# Patient Record
Sex: FEMALE | URBAN - NONMETROPOLITAN AREA
[De-identification: names, ages, dates, MRNs, and addresses within clinical notes are randomized per-mention and may not be internally consistent; named-entity substitution may affect disease eponyms.]

---

## 2019-06-18 ENCOUNTER — DOCTOR'S OFFICE (OUTPATIENT)
Dept: URBAN - NONMETROPOLITAN AREA CLINIC 1 | Facility: CLINIC | Age: 44
Setting detail: OPHTHALMOLOGY
End: 2019-06-18
Payer: COMMERCIAL

## 2019-06-18 ENCOUNTER — RX ONLY (RX ONLY)
Age: 44
End: 2019-06-18

## 2019-06-18 DIAGNOSIS — H52.13: ICD-10-CM

## 2019-06-18 DIAGNOSIS — H52.223: ICD-10-CM

## 2019-06-18 PROCEDURE — 92004 COMPRE OPH EXAM NEW PT 1/>: CPT | Performed by: OPTOMETRIST

## 2019-06-18 PROCEDURE — 92310 CONTACT LENS FITTING OU: CPT | Performed by: OPTOMETRIST

## 2019-06-18 ASSESSMENT — REFRACTION_MANIFEST
OD_SPHERE: -3.25
OD_AXIS: 010
OU_VA: 20/
OS_VA2: 20/20-2
OS_VA2: 20/
OS_AXIS: 170
OS_CYLINDER: -2.50
OD_VA1: 20/
OS_SPHERE: -3.25
OD_VA1: 20/20-2
OS_VA1: 20/
OS_VA1: 20/20-2
OD_CYLINDER: -2.50
OS_VA3: 20/
OD_VA2: 20/20-2
OD_VA3: 20/
OD_VA3: 20/
OS_VA3: 20/
OD_VA2: 20/
OU_VA: 20/

## 2019-06-18 ASSESSMENT — REFRACTION_CURRENTRX
OD_OVR_VA: 20/
OD_AXIS: 004
OS_SPHERE: -3.50
OD_SPHERE: -3.00
OS_CYLINDER: -2.50
OD_CYLINDER: -2.00
OD_VPRISM_DIRECTION: SV
OS_AXIS: 166
OS_OVR_VA: 20/
OS_VPRISM_DIRECTION: SV
OS_OVR_VA: 20/
OD_OVR_VA: 20/
OS_OVR_VA: 20/
OD_OVR_VA: 20/

## 2019-06-18 ASSESSMENT — REFRACTION_AUTOREFRACTION
OS_AXIS: 171
OS_SPHERE: -3.50
OD_SPHERE: -4.50
OS_CYLINDER: -3.00
OD_AXIS: 013
OD_CYLINDER: -2.75

## 2019-06-18 ASSESSMENT — SUPERFICIAL PUNCTATE KERATITIS (SPK)
OS_SPK: 1+
OD_SPK: 1+

## 2019-06-18 ASSESSMENT — CONFRONTATIONAL VISUAL FIELD TEST (CVF)
OD_FINDINGS: FULL
OS_FINDINGS: FULL

## 2019-06-18 ASSESSMENT — SPHEQUIV_DERIVED
OS_SPHEQUIV: -4.5
OD_SPHEQUIV: -5.875
OD_SPHEQUIV: -4.5
OS_SPHEQUIV: -5

## 2019-06-18 ASSESSMENT — DRY EYES - PHYSICIAN NOTES
OS_GENERALCOMMENTS: +VE SCATTERED INFERIOR SPK
OD_GENERALCOMMENTS: +VE SCATTERED INFERIOR SPK

## 2019-06-18 ASSESSMENT — VISUAL ACUITY
OD_BCVA: 20/20-1
OS_BCVA: 20/30-2

## 2019-07-02 ENCOUNTER — OPTICAL OFFICE (OUTPATIENT)
Dept: URBAN - NONMETROPOLITAN AREA CLINIC 4 | Facility: CLINIC | Age: 44
Setting detail: OPHTHALMOLOGY
End: 2019-07-02

## 2019-07-02 DIAGNOSIS — H52.223: ICD-10-CM

## 2019-07-02 PROCEDURE — S0500 DISPOS CONT LENS: HCPCS | Performed by: OPTOMETRIST

## 2020-06-11 ENCOUNTER — OPTICAL OFFICE (OUTPATIENT)
Dept: URBAN - NONMETROPOLITAN AREA CLINIC 4 | Facility: CLINIC | Age: 45
Setting detail: OPHTHALMOLOGY
End: 2020-06-11

## 2020-06-11 DIAGNOSIS — H52.223: ICD-10-CM

## 2020-06-11 PROCEDURE — S0500 DISPOS CONT LENS: HCPCS | Performed by: OPTOMETRIST

## 2020-08-24 ENCOUNTER — DOCTOR'S OFFICE (OUTPATIENT)
Dept: URBAN - NONMETROPOLITAN AREA CLINIC 1 | Facility: CLINIC | Age: 45
Setting detail: OPHTHALMOLOGY
End: 2020-08-24
Payer: COMMERCIAL

## 2020-08-24 DIAGNOSIS — H52.223: ICD-10-CM

## 2020-08-24 DIAGNOSIS — H52.13: ICD-10-CM

## 2020-08-24 PROCEDURE — 92310 CONTACT LENS FITTING OU: CPT | Performed by: OPTOMETRIST

## 2020-08-24 PROCEDURE — 92014 COMPRE OPH EXAM EST PT 1/>: CPT | Performed by: OPTOMETRIST

## 2020-08-24 ASSESSMENT — REFRACTION_CURRENTRX
OD_SPHERE: -3.25
OD_AXIS: 010
OD_VPRISM_DIRECTION: SV
OD_OVR_VA: 20/
OD_CYLINDER: -2.50
OS_SPHERE: -3.25
OS_OVR_VA: 20/
OS_AXIS: 170
OS_CYLINDER: -2.50
OS_VPRISM_DIRECTION: SV

## 2020-08-24 ASSESSMENT — VISUAL ACUITY
OS_BCVA: 20/30-2
OD_BCVA: 20/30-1

## 2020-08-24 ASSESSMENT — REFRACTION_AUTOREFRACTION
OS_SPHERE: -4.00
OD_AXIS: 016
OD_SPHERE: -3.50
OS_AXIS: 171
OS_CYLINDER: -2.25
OD_CYLINDER: -1.50

## 2020-08-24 ASSESSMENT — SPHEQUIV_DERIVED
OS_SPHEQUIV: -5.125
OD_SPHEQUIV: -4.75
OD_SPHEQUIV: -4.25
OS_SPHEQUIV: -5.25

## 2020-08-24 ASSESSMENT — REFRACTION_MANIFEST
OD_CYLINDER: -2.50
OS_SPHERE: -4.00
OD_VA2: 20/20-2
OS_VA1: 20/20-2
OS_CYLINDER: -2.50
OD_AXIS: 010
OD_SPHERE: -3.50
OD_VA1: 20/20-2
OS_VA2: 20/20-2
OS_AXIS: 170

## 2020-08-24 ASSESSMENT — DRY EYES - PHYSICIAN NOTES
OD_GENERALCOMMENTS: +VE SCATTERED INFERIOR SPK
OS_GENERALCOMMENTS: +VE SCATTERED INFERIOR SPK

## 2020-08-24 ASSESSMENT — SUPERFICIAL PUNCTATE KERATITIS (SPK)
OD_SPK: 1+
OS_SPK: 1+

## 2020-08-24 ASSESSMENT — CONFRONTATIONAL VISUAL FIELD TEST (CVF)
OS_FINDINGS: FULL
OD_FINDINGS: FULL

## 2021-04-08 DIAGNOSIS — Z23 ENCOUNTER FOR IMMUNIZATION: ICD-10-CM

## 2021-08-25 ENCOUNTER — DOCTOR'S OFFICE (OUTPATIENT)
Dept: URBAN - NONMETROPOLITAN AREA CLINIC 1 | Facility: CLINIC | Age: 46
Setting detail: OPHTHALMOLOGY
End: 2021-08-25
Payer: COMMERCIAL

## 2021-08-25 DIAGNOSIS — H52.4: ICD-10-CM

## 2021-08-25 DIAGNOSIS — H52.223: ICD-10-CM

## 2021-08-25 PROBLEM — E05.00 GRAVES/THYROID OPH NO CRISIS ; BOTH EYES: Status: ACTIVE | Noted: 2019-06-18

## 2021-08-25 PROBLEM — H04.123 DRY EYE; BOTH EYES: Status: ACTIVE | Noted: 2019-06-18

## 2021-08-25 PROCEDURE — 92310 CONTACT LENS FITTING OU: CPT | Performed by: OPTOMETRIST

## 2021-08-25 PROCEDURE — 92014 COMPRE OPH EXAM EST PT 1/>: CPT | Performed by: OPTOMETRIST

## 2021-08-25 ASSESSMENT — REFRACTION_CURRENTRX
OD_SPHERE: -3.25
OS_AXIS: 168
OS_SPHERE: -3.25
OD_OVR_VA: 20/
OD_CYLINDER: -2.75
OS_VPRISM_DIRECTION: SV
OD_VPRISM_DIRECTION: SV
OS_OVR_VA: 20/
OD_AXIS: 012
OS_CYLINDER: -2.75

## 2021-08-25 ASSESSMENT — CONFRONTATIONAL VISUAL FIELD TEST (CVF)
OS_FINDINGS: FULL
OD_FINDINGS: FULL

## 2021-08-25 ASSESSMENT — VISUAL ACUITY
OD_BCVA: 20/25-2
OS_BCVA: 20/25-2

## 2021-08-25 ASSESSMENT — REFRACTION_AUTOREFRACTION
OD_AXIS: 019
OS_AXIS: 168
OS_SPHERE: -4.00
OD_SPHERE: -2.25
OS_CYLINDER: -2.50
OD_CYLINDER: -3.25

## 2021-08-25 ASSESSMENT — REFRACTION_MANIFEST
OS_ADD: +1.50
OS_AXIS: 170
OD_AXIS: 010
OD_ADD: +1.50
OS_CYLINDER: -2.50
OD_CYLINDER: -2.50
OD_SPHERE: -3.50
OD_VA1: 20/20-2
OS_VA2: 20/20-2
OS_SPHERE: -4.00
OS_VA1: 20/20-2
OD_VA2: 20/20-2

## 2021-08-25 ASSESSMENT — SUPERFICIAL PUNCTATE KERATITIS (SPK)
OD_SPK: 1+
OS_SPK: 1+

## 2021-08-25 ASSESSMENT — TONOMETRY
OD_IOP_MMHG: 15
OS_IOP_MMHG: 15

## 2021-08-25 ASSESSMENT — SPHEQUIV_DERIVED
OS_SPHEQUIV: -5.25
OS_SPHEQUIV: -5.25
OD_SPHEQUIV: -3.875
OD_SPHEQUIV: -4.75

## 2021-08-25 ASSESSMENT — DRY EYES - PHYSICIAN NOTES
OD_GENERALCOMMENTS: +VE SCATTERED INFERIOR SPK
OS_GENERALCOMMENTS: +VE SCATTERED INFERIOR SPK

## 2025-02-03 ENCOUNTER — OFFICE VISIT (OUTPATIENT)
Dept: URGENT CARE | Facility: CLINIC | Age: 50
End: 2025-02-03
Payer: COMMERCIAL

## 2025-02-03 ENCOUNTER — RESULTS FOLLOW-UP (OUTPATIENT)
Dept: URGENT CARE | Facility: CLINIC | Age: 50
End: 2025-02-03

## 2025-02-03 ENCOUNTER — APPOINTMENT (OUTPATIENT)
Dept: RADIOLOGY | Facility: CLINIC | Age: 50
End: 2025-02-03
Payer: COMMERCIAL

## 2025-02-03 VITALS
DIASTOLIC BLOOD PRESSURE: 64 MMHG | HEIGHT: 64 IN | TEMPERATURE: 98.1 F | BODY MASS INDEX: 32.27 KG/M2 | WEIGHT: 189 LBS | OXYGEN SATURATION: 94 % | HEART RATE: 95 BPM | SYSTOLIC BLOOD PRESSURE: 116 MMHG | RESPIRATION RATE: 14 BRPM
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
## 2025-02-03 DIAGNOSIS — S42.401A ELBOW FRACTURE, RIGHT, CLOSED, INITIAL ENCOUNTER: Primary | ICD-10-CM

## 2025-02-03 DIAGNOSIS — S59.901A ELBOW INJURY, RIGHT, INITIAL ENCOUNTER: ICD-10-CM

## 2025-02-03 PROCEDURE — 99203 OFFICE O/P NEW LOW 30 MIN: CPT

## 2025-02-03 PROCEDURE — 73080 X-RAY EXAM OF ELBOW: CPT

## 2025-02-03 PROCEDURE — 29105 APPLICATION LONG ARM SPLINT: CPT

## 2025-02-03 RX ORDER — ASCORBIC ACID 125 MG
1 TABLET,CHEWABLE ORAL DAILY
COMMUNITY

## 2025-02-03 RX ORDER — LEVOTHYROXINE SODIUM 112 UG/1
112 TABLET ORAL DAILY
COMMUNITY
Start: 2024-11-21 | End: 2025-11-21

## 2025-02-03 RX ORDER — TIRZEPATIDE 7.5 MG/.5ML
INJECTION, SOLUTION SUBCUTANEOUS
COMMUNITY

## 2025-02-03 RX ORDER — ROSUVASTATIN CALCIUM 10 MG/1
10 TABLET, COATED ORAL EVERY EVENING
COMMUNITY

## 2025-02-03 NOTE — PROGRESS NOTES
[facility]        NAME: Dulce Lopez is a 49 y.o. female  : 1975    MRN: [id number]  DATE: February 3, 2025  TIME: 3:49 PM    Assessment and Plan   Elbow fracture, right, closed, initial encounter [S42.401A]  1. Elbow fracture, right, closed, initial encounter  XR elbow 3+ vw right    Ambulatory Referral to Orthopedic Surgery    Splint application      2. Fall on same level due to ice and snow, initial encounter  XR elbow 3+ vw right    Ambulatory Referral to Orthopedic Surgery    Splint application        Provider Radiology Interpretation (preliminary)   Final results will be as per official Radiology Report when available:   RIGHT ELBOW: fracture of the olecranon process and avulsion fracture of the coronoid of the ulna    Placed in orthoglass splint (long arm) and instructed on care. Instructed to follow up with orthopedics tomorrow and to call for appt. If any worsening pain, swelling, numbness/tingling, or you develop coolness to the fingers of your right hand compared to the left hand you are advised to seek care with the ER.     Patient Instructions       Follow up with PCP in 3-5 days.  Proceed to  ER if symptoms worsen.    If tests are performed, our office will contact you with results only if changes need to made to the care plan discussed with you at the visit. [facility]    Chief Complaint     Chief Complaint   Patient presents with    Right Elbow Injury     Fell on ice and landed on Right Elbow. Upson a pop. Swelling, pops/cracks w/movement. Cannot bend arm up and down at elbow. Numbness @ elbow.          History of Present Illness       49-year-old female patient with past medical history of thyroid disease presents to this clinic with complaint of right elbow injury.  Patient reports she fell on the ice and landed on her right elbow.  She reports hearing a popping sensation.  There is no swelling and she reports the elbow pops/cracks  "with movement.  She is unable to bend her arm up and down at the elbow as this causes increased pain.  She reports a numbness feeling at the elbow.    Review of Systems   Review of Systems   Musculoskeletal:  Positive for arthralgias and joint swelling.         Current Medications       Current Outpatient Medications:     levothyroxine 112 mcg tablet, Take 112 mcg by mouth daily, Disp: , Rfl:     Multiple Vitamins-Minerals (Multi Adult Gummies) CHEW, Chew 1 tablet daily, Disp: , Rfl:     rosuvastatin (CRESTOR) 10 MG tablet, Take 10 mg by mouth every evening, Disp: , Rfl:     Zepbound 7.5 MG/0.5ML auto-injector, inject 7.5 mg under the skin every 7 days, Disp: , Rfl:     Current Allergies     Allergies as of 02/03/2025    (No Known Allergies)            The following portions of the patient's history were reviewed and updated as appropriate: allergies, current medications, past family history, past medical history, past social history, past surgical history and problem list.     Past Medical History:   Diagnosis Date    Disease of thyroid gland        Past Surgical History:   Procedure Laterality Date    CHOLECYSTECTOMY      EYE SURGERY      THYROIDECTOMY      TUBAL LIGATION         Family History   Problem Relation Age of Onset    Hyperlipidemia Mother     Cancer Father     Hyperlipidemia Father     Atrial fibrillation Father          Medications have been verified.        Objective   /64   Pulse 95   Temp 98.1 °F (36.7 °C)   Resp 14   Ht 5' 4\" (1.626 m)   Wt 85.7 kg (189 lb)   SpO2 94%   BMI 32.44 kg/m²        Physical Exam     Physical Exam  Vitals and nursing note reviewed.   Constitutional:       Appearance: Normal appearance.   HENT:      Head: Normocephalic and atraumatic.   Cardiovascular:      Rate and Rhythm: Normal rate and regular rhythm.      Pulses: Normal pulses.      Heart sounds: Normal heart sounds.   Pulmonary:      Effort: Pulmonary effort is normal.      Breath sounds: Normal breath " "sounds.   Musculoskeletal:         General: Swelling, tenderness and signs of injury present.      Right elbow: Swelling present. Decreased range of motion. Tenderness present in olecranon process.      Left elbow: Normal.   Skin:     General: Skin is warm and dry.      Capillary Refill: Capillary refill takes less than 2 seconds.   Neurological:      General: No focal deficit present.      Mental Status: She is alert and oriented to person, place, and time.       Orthopedic injury treatment    Date/Time: 2/3/2025 3:45 PM    Performed by: DAISHA Martinez  Authorized by: DAISHA Martinez    Patient Location:  [facility] Protocol:  Procedure performed by:  Consent: Verbal consent obtained. Written consent obtained.  Risks and benefits: risks, benefits and alternatives were discussed  Time out: Immediately prior to procedure a \"time out\" was called to verify the correct patient, procedure, equipment, support staff and site/side marked as required.  Patient understanding: patient states understanding of the procedure being performed  Patient consent: the patient's understanding of the procedure matches consent given  Procedure consent: procedure consent matches procedure scheduled  Radiology Images displayed and confirmed. If images not available, report reviewed: imaging studies available  Patient identity confirmed: verbally with patient    Injury location:  Elbow  Location details:  Right elbow  Injury type:  Fracture  Fracture type: coronoid process    Neurovascular status: Neurovascularly intact    Distal perfusion: normal    Neurological function: normal    Range of motion: reduced    Local anesthesia used?: No    General anesthesia used?: No    Manipulation performed?: No    Immobilization:  Splint  Splint type:  Long arm (posterior)  Supplies used:  Cotton padding, elastic bandage and Ortho-Glass  Neurovascular status: Neurovascularly intact    Distal perfusion: normal    Neurological " function: normal    Range of motion: unchanged    Patient tolerance:  Patient tolerated the procedure well with no immediate complications   Placed in a large sling upon completion of splinting

## 2025-02-03 NOTE — PATIENT INSTRUCTIONS
Keep the splint in place and DO NOT get it wet! You must cover it if bathing and you may not remove it.     Follow up with orthopedics. Call for appt tomorrow (see number on your discharge papers)    Ice and elevate the right elbow every 2-3 hours for 15 minutes each application    If any increased pain, loss of feeling in fingers or they begin to feel extremely cold compared to the fingers on the left hand you are advised to seek care with the emergency room.

## 2025-02-04 ENCOUNTER — OFFICE VISIT (OUTPATIENT)
Dept: OBGYN CLINIC | Facility: CLINIC | Age: 50
End: 2025-02-04
Payer: COMMERCIAL

## 2025-02-04 VITALS
HEART RATE: 85 BPM | TEMPERATURE: 98.3 F | OXYGEN SATURATION: 99 % | RESPIRATION RATE: 16 BRPM | BODY MASS INDEX: 32.27 KG/M2 | WEIGHT: 189 LBS | HEIGHT: 64 IN
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
## 2025-02-04 DIAGNOSIS — S42.401A ELBOW FRACTURE, RIGHT, CLOSED, INITIAL ENCOUNTER: ICD-10-CM

## 2025-02-04 PROCEDURE — 99204 OFFICE O/P NEW MOD 45 MIN: CPT | Performed by: STUDENT IN AN ORGANIZED HEALTH CARE EDUCATION/TRAINING PROGRAM

## 2025-02-04 PROCEDURE — 24670 CLTX ULNAR FX PROX W/O MNPJ: CPT | Performed by: STUDENT IN AN ORGANIZED HEALTH CARE EDUCATION/TRAINING PROGRAM

## 2025-02-04 NOTE — PROGRESS NOTES
Orthopedic Surgery Office Note  Dulce Lopez (49 y.o. female)   : 1975   MRN: [id number]   Encounter Date: 2025 with Dr. Osbaldo Wilson DO  Chief Complaint   Patient presents with    Right Elbow - Pain       Assessment, Plan, & Discussion:     Fracture of Coronoid of Right Elbow  Discussed with the patient that:  - Reviewed physical exam and imaging with patient at time of visit. Radiographic findings demonstrate coronoid fracture of left elbow.   - due to her having no instability, treatment includes use of sling for 1 week and beginning physical therapy in 1 week for gentle ROM  - explained that she may continue the use of the sling as needed for the next 1 week with slow progression out of the sling  - referral to hand therapy was placed at time of visit to begin in 1 week for gentle ROM of right elbow  -Patient was advised that she is able to come out of the sling when sitting at home in a controlled environment and she is able to perform flexion and extension of the elbow to avoid stiffness  -Patient will follow-up in the office in 2 weeks for reevaluation with repeat x-rays of right elbow  - The patient expresses understanding and is in agreement with today's treatment plan.         Plan:   Avoid lifting with right upper extremity  NWB  Wean sling  Progress ROM as tolerated  Sling  Begin outpatient PT for gentle ROM of right elbow  Return in about 2 weeks (around 2025) for Recheck, Repeat X-ray right elbow.      Surgery:   No surgery planned at this time      Orders:     Diagnoses and all orders for this visit:    Elbow fracture, right, closed, initial encounter  -     Ambulatory Referral to Orthopedic Surgery  -     Ambulatory Referral to PT/OT Hand Therapy; Future  -     Sling  -     Fracture / Dislocation Treatment    Fall on same level due to ice and snow, initial encounter  -     Ambulatory Referral to Orthopedic Surgery         History of Present Illness:     Dulce Lopez is a 49  "y.o. female who presents for consultation following visit to urgent care, here for orthopedic follow up regarding mechanical fall on ice to right elbow. Patient states that she was holding her cell phone and the dog leash when she slipped on ice onto her buttocks and hit her elbow on the ground. She states that she heard a crack and felt several pops.  Patient was seen in urgent care on 2/4/2025 approximately 2 hours after the fall, where x-rays were obtained and demonstrated a coronoid fracture.  Patient was placed into a long-arm sugar-tong splint and a sling.  On presentation today patient states that overall she is doing well.  She states that her pain is similar to yesterday if not a little bit worse.  She states that she has not been wearing the sling due to it being uncomfortable with her splint.  She states that she is taking Tylenol as needed for pain    Review of Systems  Constitutional: Negative for fatigue, fever or loss of appetite.   HENT: Negative.    Respiratory: Negative for shortness of breath, dyspnea.    Cardiovascular: Negative for chest pain/tightness.   Gastrointestinal: Negative for abdominal pain, N/V.   Endocrine: Negative for cold/heat intolerance, unexplained weight loss/gain.   Genitourinary: Negative for flank pain, dysuria  Skin: Negative for rash.    Psychiatric/Behavioral: Negative for agitation.  All else negative unless otherwise noted in HPI    Physical Exam:   General:  Pulse 85   Temp 98.3 °F (36.8 °C) (Temporal)   Resp 16   Ht 5' 4\" (1.626 m)   Wt 85.7 kg (189 lb)   SpO2 99%   BMI 32.44 kg/m²   Cons: Appears well.  No apparent distress.  Psych: Alert. Oriented x3.  Mood and affect normal.  Eyes: PERRLA, EOMI  Resp: Normal effort.  No audible wheezing or stridor.  CV: Extremities warm and well perfused.   Abd:    No distention or guarding.   Skin: Warm. No visible lesions.  Neuro: Normal muscle tone.      Orthopedic Exam:   right Elbow -   No anatomical deformity  Skin is " warm and dry to touch with no signs of erythema or infection  Ecchymosis present at lateral aspect of right elbow  Mild soft tissue swelling or effusion noted laterally  No palpable crepitus with passive motion  TTP over lateral aspect of right elbow  ROM: flexion 90°, extension 10°, pronation 90°,  and supination 90°  MMT: deferred due to known fracture  No varus laxity, No valgus laxity  Demonstrates normal shoulder, wrist, and finger motion  No radial head instability  No ulnar nerve subluxation  2+ distal radial pulse with brisk capillary refill to the fingers  Radial, median, and ulnar motor and sensory distrubution intact  Sensation to light touch intact distally         Imaging/Studies:     Study: XR right elbow  Date: 2/3/25  Report: I have read and agree with the radiologist report. and I have personally reviewed the imaging in PACS and my impression is as follows:  IMPRESSION:     Acute nondisplaced fracture involving the coronoid process of the ulna.      Fracture / Dislocation Treatment    Date/Time: 2/4/2025 1:00 PM    Performed by: Osbaldo Wilson DO  Authorized by: Osbaldo Wilson DO    Patient Location:  [facility]  [place] Protocol:  procedure performed by consultantConsent: Verbal consent obtained.  Risks and benefits: risks, benefits and alternatives were discussed  Consent given by: patient  Patient understanding: patient states understanding of the procedure being performed  Site marked: the operative site was marked  Radiology Images displayed and confirmed. If images not available, report reviewed: imaging studies available  Patient identity confirmed: verbally with patient    Injury location:  Elbow  Location details:  Right elbow  Injury type:  Fracture  Fracture type: coronoid process    Neurovascular status: Neurovascularly intact    Distal perfusion: normal    Neurological function: normal    Range of motion: reduced    Local anesthesia used?: No    Manipulation performed?: No     Immobilization:  Sling  Neurovascular status: Neurovascularly intact    Distal perfusion: normal    Neurological function: normal    Range of motion: unchanged    Patient tolerance:  Patient tolerated the procedure well with no immediate complications           Medical, Surgical, Family, and Social History    The patient's medical history, family history, and social history, were reviewed and updated as appropriate.    Past Medical History:   Diagnosis Date    Disease of thyroid gland      Past Surgical History:   Procedure Laterality Date    CHOLECYSTECTOMY      EYE SURGERY      THYROIDECTOMY      TUBAL LIGATION       Family History   Problem Relation Age of Onset    Hyperlipidemia Mother     Cancer Father     Hyperlipidemia Father     Atrial fibrillation Father      Social History     Occupational History    Not on file   Tobacco Use    Smoking status: Never    Smokeless tobacco: Never   Vaping Use    Vaping status: Never Used   Substance and Sexual Activity    Alcohol use: Never    Drug use: Never    Sexual activity: Not on file     No Known Allergies    Current Outpatient Medications:     levothyroxine 112 mcg tablet, Take 112 mcg by mouth daily, Disp: , Rfl:     Multiple Vitamins-Minerals (Multi Adult Gummies) CHEW, Chew 1 tablet daily, Disp: , Rfl:     rosuvastatin (CRESTOR) 10 MG tablet, Take 10 mg by mouth every evening, Disp: , Rfl:     Zepbound 7.5 MG/0.5ML auto-injector, inject 7.5 mg under the skin every 7 days, Disp: , Rfl:       This Visit:       Scribe Attestation      I,:  Vania Cannon am acting as a scribe while in the presence of the attending physician.:       I,:  Osbaldo Wilson DO personally performed the services described in this documentation    as scribed in my presence.:             Dr. Osbaldo Wilson DO, Orthopedic Surgeon  Orthopedic Oncology & Sarcoma Surgery

## 2025-02-18 ENCOUNTER — OFFICE VISIT (OUTPATIENT)
Dept: OBGYN CLINIC | Facility: CLINIC | Age: 50
End: 2025-02-18

## 2025-02-18 ENCOUNTER — APPOINTMENT (OUTPATIENT)
Dept: RADIOLOGY | Facility: MEDICAL CENTER | Age: 50
End: 2025-02-18
Payer: COMMERCIAL

## 2025-02-18 VITALS
BODY MASS INDEX: 31.92 KG/M2 | TEMPERATURE: 98.2 F | HEIGHT: 64 IN | WEIGHT: 187 LBS | HEART RATE: 89 BPM | RESPIRATION RATE: 16 BRPM | OXYGEN SATURATION: 98 %

## 2025-02-18 DIAGNOSIS — S42.401A ELBOW FRACTURE, RIGHT, CLOSED, INITIAL ENCOUNTER: Primary | ICD-10-CM

## 2025-02-18 DIAGNOSIS — S42.401A ELBOW FRACTURE, RIGHT, CLOSED, INITIAL ENCOUNTER: ICD-10-CM

## 2025-02-18 PROCEDURE — 73080 X-RAY EXAM OF ELBOW: CPT

## 2025-02-18 PROCEDURE — 99024 POSTOP FOLLOW-UP VISIT: CPT | Performed by: STUDENT IN AN ORGANIZED HEALTH CARE EDUCATION/TRAINING PROGRAM

## 2025-02-18 NOTE — PROGRESS NOTES
Orthopedic Surgery Office Note  Dulce Lopez (49 y.o. female)   : 1975   MRN: [id number]   Encounter Date: 2025 with Dr. Osbaldo Wilson,   Chief Complaint   Patient presents with    Right Elbow - Follow-up       Assessment, Plan, & Discussion:     Fracture of Coronoid of Right Elbow  Discussed with the patient that:  - Reviewed physical exam and imaging with patient at time of visit. Radiographic findings demonstrate coronoid fracture of left elbow.   - discussed with the patient maintaining a 10lb lifting restriction with her right upper extremity until 6 weeks from date of injury  - continue with physical therapy until follow-up evaluation in 4 weeks  -Patient will follow-up in the office in 4 weeks for reevaluation with repeat x-rays of right elbow  - The patient expresses understanding and is in agreement with today's treatment plan.       Plan:   10 lb lifting restriction  WBAT  Progress ROM as tolerated  Continue outpatient PT to right upper extremity  Return in about 4 weeks (around 3/18/2025) for Recheck, Repeat X-ray right elbow.      Surgery:   No surgery planned at this time      Orders:     Diagnoses and all orders for this visit:    Elbow fracture, right, closed, initial encounter  -     XR elbow 3+ vw right; Future         History of Present Illness:     Interval HPI: Patient states that she has had 3 in person sessions of physical therapy to this point and is doing the home exercises on her off days.  Patient states that she is doing extremely well compared to 2 weeks ago.  She states that she is able to empty the  by lifting dishes 1 at a time with only mild discomfort.  She states that she does still have mild pain with movement in certain directions where she gets a twinge of pain however she is barely taking anything for pain anymore may be 1 pill every 2 to 4 days if needed.    Initial HPI: Dulce Lopez is a 49 y.o. female who presents for consultation following  "visit to urgent care, here for orthopedic follow up regarding mechanical fall on ice to right elbow. Patient states that she was holding her cell phone and the dog leash when she slipped on ice onto her buttocks and hit her elbow on the ground. She states that she heard a crack and felt several pops.  Patient was seen in urgent care on 2/4/2025 approximately 2 hours after the fall, where x-rays were obtained and demonstrated a coronoid fracture.  Patient was placed into a long-arm sugar-tong splint and a sling.  On presentation today patient states that overall she is doing well.  She states that her pain is similar to yesterday if not a little bit worse.  She states that she has not been wearing the sling due to it being uncomfortable with her splint.  She states that she is taking Tylenol as needed for pain    Review of Systems  Constitutional: Negative for fatigue, fever or loss of appetite.   HENT: Negative.    Respiratory: Negative for shortness of breath, dyspnea.    Cardiovascular: Negative for chest pain/tightness.   Gastrointestinal: Negative for abdominal pain, N/V.   Endocrine: Negative for cold/heat intolerance, unexplained weight loss/gain.   Genitourinary: Negative for flank pain, dysuria  Skin: Negative for rash.    Psychiatric/Behavioral: Negative for agitation.  All else negative unless otherwise noted in HPI    Physical Exam:   General:  Pulse 89   Temp 98.2 °F (36.8 °C) (Temporal)   Resp 16   Ht 5' 4\" (1.626 m)   Wt 84.8 kg (187 lb)   SpO2 98%   BMI 32.10 kg/m²   Cons: Appears well.  No apparent distress.  Psych: Alert. Oriented x3.  Mood and affect normal.  Eyes: PERRLA, EOMI  Resp: Normal effort.  No audible wheezing or stridor.  CV: Extremities warm and well perfused.   Abd:    No distention or guarding.   Skin: Warm. No visible lesions.  Neuro: Normal muscle tone.      Orthopedic Exam:   right Elbow -   No anatomical deformity  Skin is warm and dry to touch with no signs of erythema or " infection  Ecchymosis resolved  Resolved soft tissue swelling  No palpable crepitus with passive motion  TTP over lateral aspect of right elbow  ROM: flexion 130°, extension 5°, pronation 90°,  and supination 90°  MMT: deferred due to known fracture  No varus laxity, No valgus laxity  Demonstrates normal shoulder, wrist, and finger motion  No radial head instability  No ulnar nerve subluxation  2+ distal radial pulse with brisk capillary refill to the fingers  Radial, median, and ulnar motor and sensory distrubution intact  Sensation to light touch intact distally         Imaging/Studies:     Study: XR right elbow  Date: 2/18/25  Report: No radiologist report was available at this time.  and I have personally reviewed the imaging in PACS and my impression is as follows:  Stable elbow with no Ddisplacement of fractures.  Healing coronoid fracture.  No evidence of elbow subluxation or dislocation      Study: XR right elbow  Date: 2/3/25  Report: I have read and agree with the radiologist report. and I have personally reviewed the imaging in PACS and my impression is as follows:  IMPRESSION:     Acute nondisplaced fracture involving the coronoid process of the ulna.      Procedures   None performed today      Medical, Surgical, Family, and Social History    The patient's medical history, family history, and social history, were reviewed and updated as appropriate.    Past Medical History:   Diagnosis Date    Disease of thyroid gland      Past Surgical History:   Procedure Laterality Date    CHOLECYSTECTOMY      EYE SURGERY      THYROIDECTOMY      TUBAL LIGATION       Family History   Problem Relation Age of Onset    Hyperlipidemia Mother     Cancer Father     Hyperlipidemia Father     Atrial fibrillation Father      Social History     Occupational History    Not on file   Tobacco Use    Smoking status: Never    Smokeless tobacco: Never   Vaping Use    Vaping status: Never Used   Substance and Sexual Activity    Alcohol  use: Never    Drug use: Never    Sexual activity: Not on file     No Known Allergies    Current Outpatient Medications:     levothyroxine 112 mcg tablet, Take 112 mcg by mouth daily, Disp: , Rfl:     Multiple Vitamins-Minerals (Multi Adult Gummies) CHEW, Chew 1 tablet daily, Disp: , Rfl:     rosuvastatin (CRESTOR) 10 MG tablet, Take 10 mg by mouth every evening, Disp: , Rfl:     Zepbound 7.5 MG/0.5ML auto-injector, inject 7.5 mg under the skin every 7 days, Disp: , Rfl:       This Visit:       Scribe Attestation      I,:  Vania Cannon am acting as a scribe while in the presence of the attending physician.:       I,:  Osbaldo Wilson DO personally performed the services described in this documentation    as scribed in my presence.:             Dr. Osbaldo Wilson DO, Orthopedic Surgeon  Orthopedic Oncology [facility] Surgery

## 2025-03-18 ENCOUNTER — APPOINTMENT (OUTPATIENT)
Dept: RADIOLOGY | Facility: MEDICAL CENTER | Age: 50
End: 2025-03-18
Payer: COMMERCIAL

## 2025-03-18 ENCOUNTER — OFFICE VISIT (OUTPATIENT)
Dept: OBGYN CLINIC | Facility: CLINIC | Age: 50
End: 2025-03-18

## 2025-03-18 VITALS
RESPIRATION RATE: 16 BRPM | HEIGHT: 64 IN | OXYGEN SATURATION: 98 % | HEART RATE: 88 BPM | TEMPERATURE: 98 F | WEIGHT: 189 LBS | BODY MASS INDEX: 32.27 KG/M2

## 2025-03-18 DIAGNOSIS — S42.401A ELBOW FRACTURE, RIGHT, CLOSED, INITIAL ENCOUNTER: Primary | ICD-10-CM

## 2025-03-18 DIAGNOSIS — M54.50 ACUTE MIDLINE LOW BACK PAIN WITHOUT SCIATICA: ICD-10-CM

## 2025-03-18 DIAGNOSIS — M53.3 PAIN IN SACRUM: ICD-10-CM
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
## 2025-03-18 PROCEDURE — 72220 X-RAY EXAM SACRUM TAILBONE: CPT

## 2025-03-18 PROCEDURE — 99024 POSTOP FOLLOW-UP VISIT: CPT | Performed by: STUDENT IN AN ORGANIZED HEALTH CARE EDUCATION/TRAINING PROGRAM

## 2025-03-18 PROCEDURE — 72110 X-RAY EXAM L-2 SPINE 4/>VWS: CPT

## 2025-03-18 PROCEDURE — 73080 X-RAY EXAM OF ELBOW: CPT

## 2025-03-18 RX ORDER — VALACYCLOVIR HYDROCHLORIDE 500 MG/1
1 TABLET, FILM COATED ORAL 2 TIMES DAILY
COMMUNITY
Start: 2025-03-06

## 2025-03-18 RX ORDER — METRONIDAZOLE 500 MG/1
1 TABLET ORAL 2 TIMES DAILY
COMMUNITY
Start: 2025-03-06

## 2025-03-18 NOTE — PROGRESS NOTES
Orthopedic Surgery Office Note  Dulce Lopez (49 y.o. female)   : 1975   MRN: [id number]   Encounter Date: 3/18/2025 with Dr. Osbaldo Wilson, DO  Chief Complaint   Patient presents with    Right Elbow - Follow-up     Pain occurs when extending it       :  Assessment & Plan  Elbow fracture, right, closed, initial encounter  Discussed with the patient that:  - Reviewed physical exam and imaging with patient at time of visit. Radiographic findings demonstrate coronoid fracture of left elbow.   - discussed with the patient that she is able to slowly progress from her 10lb lifting restriction with her right upper extremity to include right shoulder ROM and strengthening  -Updated physical therapy prescription was placed into patient's chart at time of visit, to include right shoulder strengthening as well as core strengthening  -Patient will follow-up in the office in 6 weeks for reevaluation with repeat x-rays of right elbow  - The patient expresses understanding and is in agreement with today's treatment plan.     Orders:    XR elbow 3+ vw right    Ambulatory Referral to Physical Therapy; Future    Pain in sacrum  -Reviewed physical exam and imaging with patient at time of visit. Radiographic findings demonstrate no acute osseous abnormality, fracture, or dislocation of sacrum/coccyx. Her symptoms are consistent with possible bone contusion of her tailbone.       Orders:    XR sacrum and coccyx; Future        Plan:   No lifting restrictions  With progression to return to normal daily activities  WBAT  Progress ROM as tolerated  Continue outpatient PT to right upper extremity to include shoulder ROM and strengthening as well as core strengthening  Return in about 6 weeks (around 2025) for Recheck.      Surgery:   No surgery planned at this time      Orders:     Diagnoses and all orders for this visit:    Elbow fracture, right, closed, initial encounter  -     XR elbow 3+ vw right  -     Ambulatory  "Referral to Physical Therapy; Future    Pain in sacrum  -     XR sacrum and coccyx; Future    Fall on same level due to ice and snow, initial encounter  -     XR elbow 3+ vw right  -     XR spine lumbar minimum 4 views non injury; Future    Other orders  -     metroNIDAZOLE (FLAGYL) 500 mg tablet; Take 1 tablet by mouth 2 (two) times a day (Patient not taking: Reported on 3/18/2025)  -     valACYclovir (VALTREX) 500 mg tablet; Take 1 tablet by mouth 2 (two) times a day (Patient not taking: Reported on 3/18/2025)         History of Present Illness:     Interval HPI from 3/18/25: Patient states that she is doing well overall.  She states that she has been continuing with outpatient physical therapy and she does feel that that she has progressed however she states that at endrange of elbow flexion and extension she does begin to experience pain.  Patient states that she has also noticed new symptoms of right shoulder pain as well as sacrum/coccyx pain.  She states that when she lays on the floor to try to do core strengthening she feels like she is laying on marbles.  Patient states that she feels that she is \"off\" and feels that when she stands that she stands crooked.    Interval HPI from 2/18/25: Patient states that she has had 3 in person sessions of physical therapy to this point and is doing the home exercises on her off days.  Patient states that she is doing extremely well compared to 2 weeks ago.  She states that she is able to empty the  by lifting dishes 1 at a time with only mild discomfort.  She states that she does still have mild pain with movement in certain directions where she gets a twinge of pain however she is barely taking anything for pain anymore may be 1 pill every 2 to 4 days if needed.    Initial HPI: Dulce Lopez is a 49 y.o. female who presents for consultation following visit to urgent care, here for orthopedic follow up regarding mechanical fall on ice to right elbow. Patient " "states that she was holding her cell phone and the dog leash when she slipped on ice onto her buttocks and hit her elbow on the ground. She states that she heard a crack and felt several pops.  Patient was seen in urgent care on 2/4/2025 approximately 2 hours after the fall, where x-rays were obtained and demonstrated a coronoid fracture.  Patient was placed into a long-arm sugar-tong splint and a sling.  On presentation today patient states that overall she is doing well.  She states that her pain is similar to yesterday if not a little bit worse.  She states that she has not been wearing the sling due to it being uncomfortable with her splint.  She states that she is taking Tylenol as needed for pain    Review of Systems  Constitutional: Negative for fatigue, fever or loss of appetite.   HENT: Negative.    Respiratory: Negative for shortness of breath, dyspnea.    Cardiovascular: Negative for chest pain/tightness.   Gastrointestinal: Negative for abdominal pain, N/V.   Endocrine: Negative for cold/heat intolerance, unexplained weight loss/gain.   Genitourinary: Negative for flank pain, dysuria  Skin: Negative for rash.    Psychiatric/Behavioral: Negative for agitation.  All else negative unless otherwise noted in HPI    Physical Exam:   General:  Pulse 88   Temp 98 °F (36.7 °C) (Temporal)   Resp 16   Ht 5' 4\" (1.626 m)   Wt 85.7 kg (189 lb)   SpO2 98%   BMI 32.44 kg/m²   Cons: Appears well.  No apparent distress.  Psych: Alert. Oriented x3.  Mood and affect normal.  Eyes: PERRLA, EOMI  Resp: Normal effort.  No audible wheezing or stridor.  CV: Extremities warm and well perfused.   Abd:    No distention or guarding.   Skin: Warm. No visible lesions.  Neuro: Normal muscle tone.      Orthopedic Exam:   right Elbow -   No anatomical deformity  Skin is warm and dry to touch with no signs of erythema or infection  Ecchymosis resolved  Resolved soft tissue swelling  No palpable crepitus with passive motion  TTP " over lateral aspect of right elbow  ROM: flexion 130°, extension 5°, pronation 90°,  and supination 90°  MMT: deferred due to known fracture  No varus laxity, No valgus laxity  Demonstrates normal shoulder, wrist, and finger motion  No radial head instability  No ulnar nerve subluxation  2+ distal radial pulse with brisk capillary refill to the fingers  Radial, median, and ulnar motor and sensory distrubution intact  Sensation to light touch intact distally         Imaging/Studies:     Study: XR sacrum/coccyx  Date: 3/18/25  Report: No radiologist report was available at this time.  and I have personally reviewed the imaging in PACS and my impression is as follows:  No acute osseous abnormality, fracture, or dislocation.      Study: XR right elbow  Date: 3/18/25  Report: No radiologist report was available at this time.  and I have personally reviewed the imaging in PACS and my impression is as follows:  Healing coronoid fracture with no evidence of continued osteolysis fracture or dislocation of the elbow      Study: XR right elbow  Date: 2/18/25  Report: No radiologist report was available at this time.  and I have personally reviewed the imaging in PACS and my impression is as follows:  Stable elbow with no Ddisplacement of fractures.  Healing coronoid fracture.  No evidence of elbow subluxation or dislocation      Study: XR right elbow  Date: 2/3/25  Report: I have read and agree with the radiologist report. and I have personally reviewed the imaging in PACS and my impression is as follows:  IMPRESSION:     Acute nondisplaced fracture involving the coronoid process of the ulna.      Procedures   None performed today      Medical, Surgical, Family, and Social History    The patient's medical history, family history, and social history, were reviewed and updated as appropriate.    Past Medical History:   Diagnosis Date    Disease of thyroid gland      Past Surgical History:   Procedure Laterality Date     CHOLECYSTECTOMY      EYE SURGERY      THYROIDECTOMY      TUBAL LIGATION       Family History   Problem Relation Age of Onset    Hyperlipidemia Mother     Cancer Father     Hyperlipidemia Father     Atrial fibrillation Father      Social History     Occupational History    Not on file   Tobacco Use    Smoking status: Never    Smokeless tobacco: Never   Vaping Use    Vaping status: Never Used   Substance and Sexual Activity    Alcohol use: Never    Drug use: Never    Sexual activity: Not on file     No Known Allergies    Current Outpatient Medications:     levothyroxine 112 mcg tablet, Take 112 mcg by mouth daily, Disp: , Rfl:     Multiple Vitamins-Minerals (Multi Adult Gummies) CHEW, Chew 1 tablet daily, Disp: , Rfl:     rosuvastatin (CRESTOR) 10 MG tablet, Take 10 mg by mouth every evening, Disp: , Rfl:     Zepbound 7.5 MG/0.5ML auto-injector, inject 7.5 mg under the skin every 7 days, Disp: , Rfl:     metroNIDAZOLE (FLAGYL) 500 mg tablet, Take 1 tablet by mouth 2 (two) times a day (Patient not taking: Reported on 3/18/2025), Disp: , Rfl:     valACYclovir (VALTREX) 500 mg tablet, Take 1 tablet by mouth 2 (two) times a day (Patient not taking: Reported on 3/18/2025), Disp: , Rfl:       This Visit:       Scribe Attestation      I,:  Vania Cannon am acting as a scribe while in the presence of the attending physician.:       I,:  Osbaldo Wilson DO personally performed the services described in this documentation    as scribed in my presence.:             Dr. Osbaldo Wilson DO, Orthopedic Surgeon  [facility]

## 2025-03-18 NOTE — ASSESSMENT & PLAN NOTE
Discussed with the patient that:  - Reviewed physical exam and imaging with patient at time of visit. Radiographic findings demonstrate coronoid fracture of left elbow.   - discussed with the patient that she is able to slowly progress from her 10lb lifting restriction with her right upper extremity to include right shoulder ROM and strengthening  -Updated physical therapy prescription was placed into patient's chart at time of visit, to include right shoulder strengthening as well as core strengthening  -Patient will follow-up in the office in 6 weeks for reevaluation with repeat x-rays of right elbow  - The patient expresses understanding and is in agreement with today's treatment plan.     Orders:    XR elbow 3+ vw right    Ambulatory Referral to Physical Therapy; Future

## 2025-04-30 ENCOUNTER — APPOINTMENT (OUTPATIENT)
Dept: RADIOLOGY | Facility: MEDICAL CENTER | Age: 50
End: 2025-04-30
Attending: PODIATRIST
Payer: COMMERCIAL

## 2025-04-30 ENCOUNTER — OFFICE VISIT (OUTPATIENT)
Dept: PODIATRY | Facility: CLINIC | Age: 50
End: 2025-04-30
Payer: COMMERCIAL

## 2025-04-30 VITALS
BODY MASS INDEX: 32.27 KG/M2 | TEMPERATURE: 98.1 F | OXYGEN SATURATION: 98 % | RESPIRATION RATE: 16 BRPM | WEIGHT: 189 LBS | HEART RATE: 76 BPM | HEIGHT: 64 IN

## 2025-04-30 DIAGNOSIS — M21.619 BUNION: ICD-10-CM

## 2025-04-30 DIAGNOSIS — M67.40 GANGLION CYST: ICD-10-CM

## 2025-04-30 DIAGNOSIS — M21.41 PES PLANUS OF BOTH FEET: Primary | ICD-10-CM

## 2025-04-30 DIAGNOSIS — M79.671 RIGHT FOOT PAIN: ICD-10-CM

## 2025-04-30 DIAGNOSIS — M21.42 PES PLANUS OF BOTH FEET: Primary | ICD-10-CM

## 2025-04-30 PROCEDURE — 99203 OFFICE O/P NEW LOW 30 MIN: CPT | Performed by: PODIATRIST

## 2025-04-30 PROCEDURE — 73630 X-RAY EXAM OF FOOT: CPT

## 2025-04-30 RX ORDER — ONDANSETRON 4 MG/1
4 TABLET, ORALLY DISINTEGRATING ORAL EVERY 12 HOURS PRN
COMMUNITY
Start: 2025-04-16

## 2025-04-30 NOTE — PROGRESS NOTES
Name: Dulce Lopez      : 1975      MRN: [id number]  Encounter Provider: Shruti Mesa DPM  Encounter Date: 2025   Enc[facility]  :  Assessment & Plan  Right foot pain    Orders:    X-ray foot right 3+ views; Future    Ambulatory referral to Physical Therapy; Future    Orthotics B/L    Pes planus of both feet    Orders:    Ambulatory referral to Physical Therapy; Future    Orthotics B/L    Bunion    Orders:    Ambulatory referral to Physical Therapy; Future    Orthotics B/L    Ganglion cyst               Imaging Reviewed at this visit (I personally reviewed/independently interpreted images and reports in PACS)  XR right foot WB 3v today's date: No acute osseous abnormalities noted.  No acute fracture or dislocation noted.   Pes planus noted with decreased calcaneal inclination angle.  IM angle 13.3 with increased medial eminence.  Medially deviated second toe at level of MPJ     IMPRESSION:  Pes planus BL  Bunion deformity BL right worse than left  Right second digit early crossover deformity  Ganglion cyst level of sinus tarsi    PLAN:  I reviewed clinical exam and radiographic imaging (XR) with patient in detail today. I have discussed with the patient the pathophysiology of this diagnosis and reviewed how the examination correlates with this diagnosis.  Orthopedics note reviewed 3/18/2025, RLE closed elbow fracture, PT  I discussed ddx for right foot soft tissue mass including postmenopausal lipoma, ganglion cyst, swelling from osteoarthritic change with.  Patient counseled at length on ganglion cyst.  Conservative management such as observation aspiration reviewed.  Surgical intervention reviewed including excision soft tissue mass if conservative modalities fail.  Patient counseled to continue to monitor area  Rest, ice, elevate, use NSAIDs until symptoms resolved  I recommend stiff bottomed sneakers/shoes (ex Asics, Vionic, New balance, [place], etc)  for daily use and Mohsen Fernandez (recovery slides) for in home use.   Patient educated on bunion deformity.  Patient advised to wear wide toebox shoes with arch support to prevent rubbing and pressure on the area.  Patient educated on second digit hammertoe and medial deviation of crossover deformity.  Patient counseled on conservative options.  Recommended toe splints or toe spacers as well as appropriate shoe gear and arch support.  Rx orthotics  Rx PT for orthotics  Patient counseled that surgical intervention is warranted if conservative treatment fails.  Patient will make a separate appointment if she would like to further discuss surgical intervention for bunion or hammertoe deformity  Patient will continue to monitor right foot pain.  If she has any questions or concerns or symptoms do not improve or worsen she will follow-up in 4 weeks for further evaluation and management.  Consider MRI for further evaluation    History of Present Illness   HPI  Dulce Lopez is a 49 y.o. female who presents for evaluation of right foot pain.  Patient states she has had a puffy area over the lateral aspect of her right foot in the area of the calcaneocuboid/sinus tarsi.  She developed sharp pain in the area with puffiness yesterday and made an appointment.  She has been treating with ice and NSAIDs.  She states the pain has largely resolved today.  Patient states she twisted her ankle many years ago but denies any other history of trauma.  She does states that she has had tenderness in the same area as the small soft tissue mass for several years.  She states her shoes are rubbing on the bump.  Patient tries to wear supportive shoe gear presents today with sneakers.      Review of Systems   Constitutional:  Negative for chills and fever.   Respiratory:  Negative for shortness of breath.    Cardiovascular:  Negative for leg swelling.   Musculoskeletal:  Positive for arthralgias.   Skin:  Negative for wound.          Objective  "  Pulse 76   Temp 98.1 °F (36.7 °C)   Resp 16   Ht 5' 4\" (1.626 m)   Wt 85.7 kg (189 lb)   SpO2 98%   BMI 32.44 kg/m²      Physical Exam  Constitutional:       General: She is not in acute distress.     Appearance: She is not ill-appearing.   Cardiovascular:      Pulses: Normal pulses.   Musculoskeletal:      Comments: Bilateral pes planus  Right foot bunion deformity with slight decrease with range of motion of first MPJ.  No significant pain with end range of motion first MPJ.  Right foot second toe deviated medially.  Reducible.  Negative Lachman's test.  Negative Michelle's click.  No pain with range of motion of second MPJ  Left foot bunion deformity less severe than right.  Slight decrease of range of motion first MPJ.  Right second toe mild hammertoe without crossover deformity noted.  Arch reconstitutes with dorsiflexion of first MPJ bilaterally  Patient able to dorsiflex at level of ankle with knee bent and extended approximately 15 degrees bilaterally    Right foot palpable soft tissue mass overlying sinus tarsi and level of calcaneocuboid joint.  Soft and uniform.  Proved.  With direct palpation.     Skin:     Capillary Refill: Capillary refill takes less than 2 seconds.   Neurological:      Sensory: No sensory deficit.           "

## 2025-04-30 NOTE — ASSESSMENT & PLAN NOTE
Orders:    X-ray foot right 3+ views; Future    Ambulatory referral to Physical Therapy; Future    Orthotics B/L

## 2025-05-07 ENCOUNTER — EVALUATION (OUTPATIENT)
Dept: PHYSICAL THERAPY | Facility: CLINIC | Age: 50
End: 2025-05-07
Payer: COMMERCIAL

## 2025-05-07 DIAGNOSIS — M21.619 BUNION: ICD-10-CM

## 2025-05-07 DIAGNOSIS — M21.42 PES PLANUS OF BOTH FEET: ICD-10-CM

## 2025-05-07 DIAGNOSIS — M21.41 PES PLANUS OF BOTH FEET: ICD-10-CM

## 2025-05-07 DIAGNOSIS — M79.671 RIGHT FOOT PAIN: Primary | ICD-10-CM

## 2025-05-07 PROCEDURE — 97760 ORTHOTIC MGMT&TRAING 1ST ENC: CPT | Performed by: PHYSICAL THERAPIST

## 2025-05-07 PROCEDURE — 97110 THERAPEUTIC EXERCISES: CPT | Performed by: PHYSICAL THERAPIST

## 2025-05-07 NOTE — PROGRESS NOTES
Orthotic Evaluation    Today's date: 25  Patient name: Dulce Lopez  : 1975  MRN: [id number]  Referring provider: Shruti Mesa DPM  Dx:   Encounter Diagnosis     ICD-10-CM    1. Right foot pain  M79.671       2. Pes planus of both feet  M21.41     M21.42       3. Bunion  M21.619           Start Time: 845  Stop Time: 930  Total time in clinic (min): 45 minutes     Subjective:    Dulce presents today for orthotic evaluation. she complains of pain, balance dysfunction, and ambulatory dysfunction with functional activities including ambulation, leisure, and work. The patient plans to use her orthotic for walking and standing. The orthotic will be placed in a standard, size 9.5 Women's.    Objective:    Age: 49 y.o.  Weight: 189    Foot Posture Index Score    Right Foot  Talar dome - +1  Talonavicular bulge - +2  Medial longitudinal arch - +1  Malleolar curve - +1   Calcaneal eversion - 0  Too many toes - 0  Total Score R = 5    Left Foot  Talar dome - +1  Talonavicular bulge - +2  Medial longitudinal arch - +1  Malleolar curve - +1   Calcaneal eversion - 0  Too many toes - 0  Total Score L = 5    Reference Values  Normal =    0 to +5  Pronated =  +6 to +9 Highly Pronated =  10+  Supinated =   -1 to -4 Highly Supinated = -5 to -12    Objective     Active Range of Motion   Left Ankle/Foot   Dorsiflexion (kf): 18 degrees   Plantar flexion: 65 degrees     Right Ankle/Foot   Dorsiflexion (kf): 21 degrees   Plantar flexion: 65 degrees     Joint Play   Left Ankle/Foot  Hypermobile in the talocrural joint, subtalar joint, midfoot and forefoot.     Right Ankle/Foot  Hypermobile in the talocrural joint, subtalar joint, midfoot and forefoot.      Strength/Myotome Testing     Left Ankle/Foot   Dorsiflexion: 4  Plantar flexion: 4+  Inversion: 4+  Eversion: 4+    Right Ankle/Foot   Dorsiflexion: 3+  Plantar flexion: 3+  Inversion: 3-  Eversion: 3-    Ambulation     Observational Gait   Decreased walking  speed, left step length and right step length.   Left foot contact pattern: heel to toe  Right foot contact pattern: heel to toe    Additional Observational Gait Details  Patient is an early excessive pronator with loss of medial longitudinal arch noted B/L R>L. There is decreased great toe extension noted B/L from mid to terminal stance.       Assessment:    Patient requires custom foot orthosis with 2 degree medial wedge B/L to correct altered gait mechanics. Patient is not currently controlled by her current shoe wear. Patient was educated on the design of the custom orthotic and it's ability to offload her current pathology. Patient was also educated on potential shoe wear changes with device, break-in period, and skin checks to avoid potential skin break down. We did spend time discussing the need for strengthening of her B/L ankles R>L along with balance and proprioception training to help decrease her instabilities when outside of her home. She will reach out to her PCP to acquire a script and will most likely continue at [facility] in [place] due to being an established patient there for her elbow.     Orthotic goals:  1) Patient will have custom foot orthoses fitted to her shoe.   2) Patient will be compliant with break-in period of custom foot orthoses as prescribed by PT.   3) Patient will be compliant with custom foot orthoses use as prescribed by PT.     Plan:    Planned therapy interventions: orthotic fitting/training  Duration in visits: 2

## 2025-05-07 NOTE — LETTER
May 7, 2025    Shruti Mesa DPM  [address] [facility] [address]    Patient: Dulce Lopez   YOB: 1975   Date of Visit: 2025     Encounter Diagnosis     ICD-10-CM    1. Right foot pain  M79.671       2. Pes planus of both feet  M21.41     M21.42       3. Bunion  M21.619           Dear Dr. Shruti Mesa DPM:    Thank you for your recent referral of Dulce Lopez. Please review the attached evaluation summary from Dulce's recent visit.     Please verify that you agree with the plan of care by signing the attached order.     If you have any questions or concerns, please do not hesitate to call.     I sincerely appreciate the opportunity to share in the care of one of your patients and hope to have another opportunity to work with you in the near future.       Sincerely,    Nguyễn Payne, PT      Referring Provider:      I certify that I have read the below Plan of Care and certify the need for these services furnished under this plan of treatment while under my care.                    Shruti Mesa DPM  [address] [facility] [address]  Via In Basket          Orthotic Evaluation    Today's date: 25  Patient name: Dulce Lopez  : 1975  MRN: [id number]  Referring provider: Shruti Mesa DPM  Dx:   Encounter Diagnosis     ICD-10-CM    1. Right foot pain  M79.671       2. Pes planus of both feet  M21.41     M21.42       3. Bunion  M21.619           Start Time: 08  Stop Time: 930  Total time in clinic (min): 45 minutes     Subjective:    Dulce presents today for orthotic evaluation. she complains of pain, balance dysfunction, and ambulatory dysfunction with functional activities including ambulation, leisure, and work. The patient plans to use her orthotic for walking and standing. The orthotic will be placed in a standard, size 9.5 Women's.    Objective:    Age: 49 y.o.  Weight: 189    Foot Posture Index Score    Right Foot  Talar dome -  +1  Talonavicular bulge - +2  Medial longitudinal arch - +1  Malleolar curve - +1   Calcaneal eversion - 0  Too many toes - 0  Total Score R = 5    Left Foot  Talar dome - +1  Talonavicular bulge - +2  Medial longitudinal arch - +1  Malleolar curve - +1   Calcaneal eversion - 0  Too many toes - 0  Total Score L = 5    Reference Values  Normal =    0 to +5  Pronated =  +6 to +9 Highly Pronated =  10+  Supinated =   -1 to -4 Highly Supinated = -5 to -12    Objective     Active Range of Motion   Left Ankle/Foot   Dorsiflexion (kf): 18 degrees   Plantar flexion: 65 degrees     Right Ankle/Foot   Dorsiflexion (kf): 21 degrees   Plantar flexion: 65 degrees     Joint Play   Left Ankle/Foot  Hypermobile in the talocrural joint, subtalar joint, midfoot and forefoot.     Right Ankle/Foot  Hypermobile in the talocrural joint, subtalar joint, midfoot and forefoot.      Strength/Myotome Testing     Left Ankle/Foot   Dorsiflexion: 4  Plantar flexion: 4+  Inversion: 4+  Eversion: 4+    Right Ankle/Foot   Dorsiflexion: 3+  Plantar flexion: 3+  Inversion: 3-  Eversion: 3-    Ambulation     Observational Gait   Decreased walking speed, left step length and right step length.   Left foot contact pattern: heel to toe  Right foot contact pattern: heel to toe    Additional Observational Gait Details  Patient is an early excessive pronator with loss of medial longitudinal arch noted B/L R>L. There is decreased great toe extension noted B/L from mid to terminal stance.       Assessment:    Patient requires custom foot orthosis with 2 degree medial wedge B/L to correct altered gait mechanics. Patient is not currently controlled by her current shoe wear. Patient was educated on the design of the custom orthotic and it's ability to offload her current pathology. Patient was also educated on potential shoe wear changes with device, break-in period, and skin checks to avoid potential skin break down. We did spend time discussing the need for  strengthening of her B/L ankles R>L along with balance and proprioception training to help decrease her instabilities when outside of her home. She will reach out to her PCP to acquire a script and will most likely continue at [facility] in [place] due to being an established patient there for her elbow.     Orthotic goals:  1) Patient will have custom foot orthoses fitted to her shoe.   2) Patient will be compliant with break-in period of custom foot orthoses as prescribed by PT.   3) Patient will be compliant with custom foot orthoses use as prescribed by PT.     Plan:    Planned therapy interventions: orthotic fitting/training  Duration in visits: 2

## 2025-05-12 ENCOUNTER — TELEPHONE (OUTPATIENT)
Age: 50
End: 2025-05-12

## 2025-05-12 NOTE — TELEPHONE ENCOUNTER
Caller: Nadia/Pro Espitiaab    Doctor/Office: Dr Mesa/Cassi    CB#: [phone]      What needs to be faxed: PT script    ATTN to: Nadia/Pro Rowe    Fax#: [phone]      Documents were successfully e-faxed

## 2025-05-12 NOTE — TELEPHONE ENCOUNTER
Nadia calling in from [facility]. The fax that was sent was blank pages and two cover sheets. Please fax the script for PT for patient Dulce to the fax number below. Thank you

## 2025-06-03 ENCOUNTER — OFFICE VISIT (OUTPATIENT)
Dept: PHYSICAL THERAPY | Facility: CLINIC | Age: 50
End: 2025-06-03
Payer: COMMERCIAL

## 2025-06-03 DIAGNOSIS — M21.41 PES PLANUS OF BOTH FEET: ICD-10-CM

## 2025-06-03 DIAGNOSIS — M21.42 PES PLANUS OF BOTH FEET: ICD-10-CM

## 2025-06-03 DIAGNOSIS — M79.671 RIGHT FOOT PAIN: Primary | ICD-10-CM

## 2025-06-03 DIAGNOSIS — M21.619 BUNION: ICD-10-CM

## 2025-06-03 PROCEDURE — L3010 FOOT LONGITUDINAL ARCH SUPPO: HCPCS | Performed by: PHYSICAL THERAPIST

## 2025-06-03 NOTE — PROGRESS NOTES
Daily Note     Today's date: 6/3/2025  Patient name: Dulce Lopez  : 1975  MRN: [id number]  Referring provider: Shruti Mesa DPM  Dx:   Encounter Diagnosis     ICD-10-CM    1. Right foot pain  M79.671       2. Pes planus of both feet  M21.41     M21.42       3. Bunion  M21.619           Start Time: 0930  Stop Time: 1000  Total time in clinic (min): 30 minutes    Subjective: Patient is here today to receive her CMO devices. She reports that she did seek intervention on her ankles to begin strengthening but has not been as compliant as she should over the past week.       Objective: See treatment diary below      Assessment: Custom orthotics fitted to patients feet in seated and standing; accurate fit/alignment/ arch support noted B. Noted good support of feet/arches as well in standing on orthotics with no abnormal positioning or alignment observed. Inlay was removed from R/L shoe and pt was instructed how to remove inlays from other shoes/sneakers at home as well. Custom orthotics were fit to pt's shoes and accurate fit was noted B. Pt noted no unusual pain/sx in standing with orthotics in shoes. Pt's gait was assessed with orthotics. Improved hindfoot and midfoot support and control were noted t/o R and L stance phases without unusual pain/sx. Pt noted overall comfort in orthotics. Reviewed and issued weaning/break in protocol with pt; instructed pt to stop wearing orthotics and contact PT if any unusual pain, increased pain/sx, redness/blisters/hot spots etc should arise; also instructed pt to call PT with any questions/concerns regarding orthotics; understanding noted/reported with no questions/concerns after session. Patient will follow up if any future problems arise. Patient reported no irritation and feeling as if there was nothing under her feet upon walking in the clinic today.       Plan: DC at this time and patient to follow up on an as needed basis with issues moving forward.